# Patient Record
Sex: FEMALE | Race: WHITE | NOT HISPANIC OR LATINO
[De-identification: names, ages, dates, MRNs, and addresses within clinical notes are randomized per-mention and may not be internally consistent; named-entity substitution may affect disease eponyms.]

---

## 2023-12-26 PROBLEM — Z00.00 ENCOUNTER FOR PREVENTIVE HEALTH EXAMINATION: Status: ACTIVE | Noted: 2023-12-26

## 2023-12-27 ENCOUNTER — APPOINTMENT (OUTPATIENT)
Dept: COLORECTAL SURGERY | Facility: CLINIC | Age: 63
End: 2023-12-27
Payer: COMMERCIAL

## 2023-12-27 ENCOUNTER — NON-APPOINTMENT (OUTPATIENT)
Age: 63
End: 2023-12-27

## 2023-12-27 VITALS
BODY MASS INDEX: 22.75 KG/M2 | TEMPERATURE: 96.3 F | WEIGHT: 130 LBS | HEART RATE: 84 BPM | HEIGHT: 63.5 IN | DIASTOLIC BLOOD PRESSURE: 86 MMHG | SYSTOLIC BLOOD PRESSURE: 136 MMHG

## 2023-12-27 DIAGNOSIS — J44.9 CHRONIC OBSTRUCTIVE PULMONARY DISEASE, UNSPECIFIED: ICD-10-CM

## 2023-12-27 DIAGNOSIS — Z82.49 FAMILY HISTORY OF ISCHEMIC HEART DISEASE AND OTHER DISEASES OF THE CIRCULATORY SYSTEM: ICD-10-CM

## 2023-12-27 DIAGNOSIS — Z78.9 OTHER SPECIFIED HEALTH STATUS: ICD-10-CM

## 2023-12-27 DIAGNOSIS — Z83.3 FAMILY HISTORY OF DIABETES MELLITUS: ICD-10-CM

## 2023-12-27 DIAGNOSIS — Z86.39 PERSONAL HISTORY OF OTHER ENDOCRINE, NUTRITIONAL AND METABOLIC DISEASE: ICD-10-CM

## 2023-12-27 DIAGNOSIS — Z80.1 FAMILY HISTORY OF MALIGNANT NEOPLASM OF TRACHEA, BRONCHUS AND LUNG: ICD-10-CM

## 2023-12-27 DIAGNOSIS — K64.9 UNSPECIFIED HEMORRHOIDS: ICD-10-CM

## 2023-12-27 DIAGNOSIS — E03.9 HYPOTHYROIDISM, UNSPECIFIED: ICD-10-CM

## 2023-12-27 DIAGNOSIS — Z85.828 PERSONAL HISTORY OF OTHER MALIGNANT NEOPLASM OF SKIN: ICD-10-CM

## 2023-12-27 DIAGNOSIS — F41.9 ANXIETY DISORDER, UNSPECIFIED: ICD-10-CM

## 2023-12-27 DIAGNOSIS — Z87.81 PERSONAL HISTORY OF (HEALED) TRAUMATIC FRACTURE: ICD-10-CM

## 2023-12-27 DIAGNOSIS — S72.91XA UNSPECIFIED FRACTURE OF RIGHT FEMUR, INITIAL ENCOUNTER FOR CLOSED FRACTURE: ICD-10-CM

## 2023-12-27 DIAGNOSIS — Z83.438 FAMILY HISTORY OF OTHER DISORDER OF LIPOPROTEIN METABOLISM AND OTHER LIPIDEMIA: ICD-10-CM

## 2023-12-27 DIAGNOSIS — I10 ESSENTIAL (PRIMARY) HYPERTENSION: ICD-10-CM

## 2023-12-27 DIAGNOSIS — F17.200 NICOTINE DEPENDENCE, UNSPECIFIED, UNCOMPLICATED: ICD-10-CM

## 2023-12-27 RX ORDER — CITALOPRAM HYDROBROMIDE 20 MG/1
20 TABLET, FILM COATED ORAL
Refills: 0 | Status: ACTIVE | COMMUNITY

## 2023-12-27 RX ORDER — AMLODIPINE BESYLATE 10 MG/1
10 TABLET ORAL
Refills: 0 | Status: ACTIVE | COMMUNITY

## 2023-12-27 RX ORDER — METOPROLOL TARTRATE 37.5 MG/1
37.5 TABLET, FILM COATED ORAL
Refills: 0 | Status: ACTIVE | COMMUNITY

## 2023-12-27 RX ORDER — LEVOTHYROXINE SODIUM 100 UG/1
100 TABLET ORAL
Refills: 0 | Status: ACTIVE | COMMUNITY

## 2023-12-27 RX ORDER — OMEPRAZOLE 40 MG/1
40 CAPSULE, DELAYED RELEASE ORAL
Refills: 0 | Status: ACTIVE | COMMUNITY

## 2023-12-27 RX ORDER — HYDROCORTISONE 25 MG/G
2.5 CREAM TOPICAL 3 TIMES DAILY
Qty: 1 | Refills: 3 | Status: ACTIVE | COMMUNITY
Start: 2023-12-27 | End: 1900-01-01

## 2023-12-27 RX ORDER — FLUTICASONE FUROATE, UMECLIDINIUM BROMIDE AND VILANTEROL TRIFENATATE 100; 62.5; 25 UG/1; UG/1; UG/1
100-62.5-25 POWDER RESPIRATORY (INHALATION)
Refills: 0 | Status: ACTIVE | COMMUNITY

## 2023-12-27 RX ORDER — MONTELUKAST 10 MG/1
10 TABLET, FILM COATED ORAL
Refills: 0 | Status: ACTIVE | COMMUNITY

## 2023-12-27 NOTE — HISTORY OF PRESENT ILLNESS
[FreeTextEntry1] : 64 y/o F presents for initial visit   referred: Dr. Leon Reason for visit: Hemorrhoids  Retired Cardiac nurse.   PMH: HTN. Anxiety, COPD, Hypothyroidism,  PSH: ORIF right ankle  FH: Denies CRC/IBD, +Lung Cancer (Father)  Meds: Synthroid 100mg QD, Montelukast 10mg daily, Omeprazole 40mg QD, Amlodipine 10mg QD, Metoprolol 37.5mg QD, Citalopram 20mg, Trelegy inhaler. Motrin 600mg.  Allergies: Transdermal Patches  Current smoker ~40 years    2, Para 1, NSVDX1  Last colonoscopy 2021, [facility], as per patient was told everything was normal.  States since she was pregnancy started having hemorrhoids, states over the years, some intermittent bleeding here and there that she managed on her own with some Preparation H.   Fractured femur at end of October and since then has had aggravated hemorrhoids due to not moving as much.  as of 2 weeks ago was in a lot of pain and currently  has two X Golf sized external hemorrhoids that have prolapsed, has used lidocaine spray, has been using Sudafed (for vasoconstricton), Preparation H with no relief. Patient states cannot sit, or touch the external area is very painful. Reports some scant bleeding here and there. Has been doing sitz bath, and using minnie-bottle in order to soothe and clean the area.    States since prolapse feels this constant sensation to defecate but does not try as she knows it will aggravate the area.  has never had hemorrhoid symptoms as serious as she has now. Has never had any other intervention done for hemorrhoids.   BM: 1-3x a day, states small soft pieces, states incomplete defecation, drinks 2 cups of coffee a day, denies constipation, Not on fiber supplements, not on Stool Softeners  Motrin 800mg takes it daily 2-3x a day for Right leg pain. NOT on Aspirin.    has not been able to complete PT for her most recent fracture due to pain.

## 2023-12-27 NOTE — ASSESSMENT
[FreeTextEntry1] : Exam findings and diagnosis were discussed at length with patient.  Currently in a hemorrhoid flare. Recommendations including increased fiber intake, adequate daily hydration, and sitz baths as needed and after bowel movements were discussed. Avoid constipation and diarrhea, avoid pushing/straining. Fiber goal 25-30g/day, recommend psyllium supplement.  Natural history of hemorrhoids discussed. Recommend hydrocortisone TID. Continue supportive care. F/u if symptoms worsen or persist. All questions answered, patient expressed understanding and is agreeable to this plan.

## 2023-12-27 NOTE — PHYSICAL EXAM
[FreeTextEntry1] : Medical assistant present for duration of physical examination   General no acute distress, alert and oriented Psych calm, pleasant demeanor, responding appropriately to questions Nonlabored breathing Ambulating with assistance of crutches Skin normal color and pigment, no visible lesions or rashes    Anorectal Exam: Inspection no erythema, induration or fluctuance, no skin excoriation, no fissure, edematous left lateral right posterior and right anterior external hemorrhoids without thrombosis, mild internal hemorrhoidal prolapse nonreducible associated with each external hemorrhoid KYLE nontender, no masses palpated, no blood on gloved finger   Procedure: Anoscopy   Pre procedure Diagnosis: hemorrhoids Post procedure Diagnosis: hemorrhoids Anesthesia: none Estimated blood loss: none Specimen: none Complications: none   Consent obtained. Anoscopy was performed by passing a lighted anoscope with lubricant jelly into the anal canal and the entire anal mucosal surface was inspected. Findings included no fissure, mild internal hemorrhoids, no visible masses or lesions in anal canal   Patient tolerated examination and procedure well.